# Patient Record
Sex: MALE | Race: WHITE | Employment: FULL TIME | URBAN - METROPOLITAN AREA
[De-identification: names, ages, dates, MRNs, and addresses within clinical notes are randomized per-mention and may not be internally consistent; named-entity substitution may affect disease eponyms.]

---

## 2019-01-07 ENCOUNTER — TELEPHONE (OUTPATIENT)
Dept: FAMILY MEDICINE CLINIC | Age: 54
End: 2019-01-07

## 2019-01-11 ENCOUNTER — TELEPHONE (OUTPATIENT)
Dept: FAMILY MEDICINE CLINIC | Age: 54
End: 2019-01-11

## 2019-01-16 ENCOUNTER — OFFICE VISIT (OUTPATIENT)
Dept: FAMILY MEDICINE CLINIC | Age: 54
End: 2019-01-16

## 2019-01-16 VITALS
HEIGHT: 68 IN | HEART RATE: 84 BPM | WEIGHT: 196 LBS | BODY MASS INDEX: 29.7 KG/M2 | SYSTOLIC BLOOD PRESSURE: 110 MMHG | DIASTOLIC BLOOD PRESSURE: 78 MMHG

## 2019-01-16 DIAGNOSIS — M54.42 CHRONIC LEFT-SIDED LOW BACK PAIN WITH LEFT-SIDED SCIATICA: ICD-10-CM

## 2019-01-16 DIAGNOSIS — G89.29 CHRONIC LEFT-SIDED LOW BACK PAIN WITH LEFT-SIDED SCIATICA: ICD-10-CM

## 2019-01-16 DIAGNOSIS — I82.492 ACUTE DEEP VEIN THROMBOSIS (DVT) OF OTHER SPECIFIED VEIN OF LEFT LOWER EXTREMITY (HCC): Primary | ICD-10-CM

## 2019-01-16 PROBLEM — I82.409 DEEP VEIN THROMBOSIS (DVT) (HCC): Status: ACTIVE | Noted: 2019-01-01

## 2019-01-16 PROCEDURE — 99213 OFFICE O/P EST LOW 20 MIN: CPT | Performed by: FAMILY MEDICINE

## 2019-01-16 RX ORDER — RIVAROXABAN 15 MG/1
TABLET, FILM COATED ORAL
Refills: 0 | COMMUNITY
Start: 2019-01-07 | End: 2019-05-30 | Stop reason: ALTCHOICE

## 2019-01-16 ASSESSMENT — PATIENT HEALTH QUESTIONNAIRE - PHQ9
SUM OF ALL RESPONSES TO PHQ9 QUESTIONS 1 & 2: 0
SUM OF ALL RESPONSES TO PHQ QUESTIONS 1-9: 0
SUM OF ALL RESPONSES TO PHQ QUESTIONS 1-9: 0
1. LITTLE INTEREST OR PLEASURE IN DOING THINGS: 0
2. FEELING DOWN, DEPRESSED OR HOPELESS: 0

## 2019-01-16 ASSESSMENT — ENCOUNTER SYMPTOMS
EYES NEGATIVE: 1
BACK PAIN: 1
BLOOD IN STOOL: 0
COUGH: 0
ABDOMINAL PAIN: 0
SHORTNESS OF BREATH: 0

## 2019-05-30 ENCOUNTER — OFFICE VISIT (OUTPATIENT)
Dept: FAMILY MEDICINE CLINIC | Age: 54
End: 2019-05-30

## 2019-05-30 VITALS
TEMPERATURE: 98.1 F | WEIGHT: 195 LBS | DIASTOLIC BLOOD PRESSURE: 88 MMHG | BODY MASS INDEX: 29.65 KG/M2 | SYSTOLIC BLOOD PRESSURE: 126 MMHG | HEART RATE: 76 BPM

## 2019-05-30 DIAGNOSIS — R10.30 LOWER ABDOMINAL PAIN: Primary | ICD-10-CM

## 2019-05-30 DIAGNOSIS — Z12.11 SCREEN FOR COLON CANCER: ICD-10-CM

## 2019-05-30 LAB
BILIRUBIN, POC: NORMAL
BLOOD URINE, POC: NORMAL
CLARITY, POC: CLEAR
COLOR, POC: YELLOW
GLUCOSE URINE, POC: NORMAL
KETONES, POC: NORMAL
LEUKOCYTE EST, POC: NORMAL
NITRITE, POC: NORMAL
PH, POC: 6.5
PROTEIN, POC: NORMAL
SPECIFIC GRAVITY, POC: 1
UROBILINOGEN, POC: 1

## 2019-05-30 PROCEDURE — 81003 URINALYSIS AUTO W/O SCOPE: CPT | Performed by: FAMILY MEDICINE

## 2019-05-30 PROCEDURE — 99213 OFFICE O/P EST LOW 20 MIN: CPT | Performed by: FAMILY MEDICINE

## 2019-05-30 ASSESSMENT — ENCOUNTER SYMPTOMS
BELCHING: 0
BLOOD IN STOOL: 0
COUGH: 0
FLATUS: 1
VOMITING: 0
CONSTIPATION: 1
DIARRHEA: 0
NAUSEA: 0
SHORTNESS OF BREATH: 0
EYES NEGATIVE: 1
ABDOMINAL PAIN: 1

## 2019-05-30 NOTE — PROGRESS NOTES
[facility] PHYSICIANS  [facility]  [address] [facility]  [address] [facility] [address]  Dept: [phone]    5/30/2019    CHIEF COMPLAINT    Chief Complaint   Patient presents with    Abdominal Pain     2 days       HPI    Jonatan Adjutant is a 47 y.o. male who presents   Chief Complaint   Patient presents with    Abdominal Pain     2 days   . Abdominal Pain   This is a new problem. The current episode started in the past 7 days. The onset quality is sudden. The problem occurs daily. The problem has been gradually improving. The pain is located in the LLQ and RLQ (worse on left). The pain is moderate. The quality of the pain is colicky. The abdominal pain does not radiate. Associated symptoms include constipation (mild) and flatus. Pertinent negatives include no belching, diarrhea, fever, frequency, headaches, hematuria, nausea or vomiting. The pain is aggravated by urination and movement (sharp pain spike when urinating. ). The pain is relieved by nothing. Treatments tried: bland diet. The treatment provided mild relief. Vitals:    05/30/19 0920   BP: 126/88   Pulse: 76   Temp: 98.1 °F (36.7 °C)   Weight: 195 lb (88.5 kg)       REVIEW OF SYSTEMS    Review of Systems   Constitutional: Negative for fatigue and fever. HENT: Negative. Eyes: Negative. Respiratory: Negative for cough and shortness of breath. Cardiovascular: Negative for chest pain and leg swelling. Gastrointestinal: Positive for abdominal pain, constipation (mild) and flatus. Negative for blood in stool, diarrhea, nausea and vomiting. See hpi   Genitourinary: Negative for frequency, hematuria and urgency. Musculoskeletal: Negative. Skin: Negative. Neurological: Negative for dizziness and headaches.        PAST MEDICAL HISTORY    Past Medical History:   Diagnosis Date    Chronic back pain     Deep vein thrombosis (DVT) ([facility] [address] 2019    left calf       FAMILY HISTORY    Family History   Problem Relation Age of Onset    Irritable Bowel Syndrome Mother        SOCIAL HISTORY    Social History     Socioeconomic History    Marital status:      Spouse name: None    Number of children: None    Years of education: None    Highest education level: None   Occupational History    None   Social Needs    Financial resource strain: None    Food insecurity:     Worry: None     Inability: None    Transportation needs:     Medical: None     Non-medical: None   Tobacco Use    Smoking status: Never Smoker    Smokeless tobacco: Never Used   Substance and Sexual Activity    Alcohol use: No    Drug use: No    Sexual activity: Yes     Partners: Female   Lifestyle    Physical activity:     Days per week: None     Minutes per session: None    Stress: None   Relationships    Social connections:     Talks on phone: None     Gets together: None     Attends [employer] service: None     Active member of club or organization: None     Attends meetings of clubs or organizations: None     Relationship status: None    Intimate partner violence:     Fear of current or ex partner: None     Emotionally abused: None     Physically abused: None     Forced sexual activity: None   Other Topics Concern    None   Social History Narrative    None       SURGICAL HISTORY    History reviewed. No pertinent surgical history. CURRENT MEDICATIONS    Current Outpatient Medications   Medication Sig Dispense Refill    aspirin 81 MG tablet Take 162 mg by mouth       No current facility-administered medications for this visit. ALLERGIES    Allergies   Allergen Reactions    Nsaids        PHYSICAL EXAM   Physical Exam   Constitutional: He is oriented to person, place, and time. He appears well-developed and well-nourished. HENT:   Head: Normocephalic. Eyes: Pupils are equal, round, and reactive to light. Neck: Normal range of motion. Neck supple.    Cardiovascular: Normal rate, regular rhythm and normal heart sounds. No murmur heard. Pulmonary/Chest: Effort normal and breath sounds normal. He has no wheezes. He has no rales. Abdominal: Soft. There is no hepatosplenomegaly. There is tenderness in the right lower quadrant and left lower quadrant. There is no rebound and no guarding. Left lower quad tenderness greater than right   Musculoskeletal: Normal range of motion. He exhibits no edema, tenderness or deformity. Lymphadenopathy:     He has no cervical adenopathy. Neurological: He is alert and oriented to person, place, and time. Skin: Skin is warm and dry. Psychiatric: He has a normal mood and affect. His behavior is normal.   Vitals reviewed. Assessment/PLan  1. Lower abdominal pain  ua-normal. Continue hydration, bland diet. Watch for fever, increase in abd pain. If worse go to ER. Discussed possible appendicitis especially if pain is on the rt side. Possible diverticulitis if pain worsens on left side. - POCT Urinalysis No Micro (Auto)-normal.     2. Screen for colon cancer    - COLOGUARD; Future      Mahendra received counseling on the following healthy behaviors: nutrition and exercise  Reviewed prior labs and health maintenance. Continue current medications, diet and exercise. Discussed use, benefit, and side effects of prescribed medications. Barriers to medication compliance addressed. Patient given educational materials - see patient instructions. All patient questions answered. Patient voiced understanding. Return if symptoms worsen or fail to improve.         Electronically signed by Julius Jenkins MD on 5/30/19 at 9:36 AM

## 2021-10-11 ENCOUNTER — TELEPHONE (OUTPATIENT)
Dept: FAMILY MEDICINE CLINIC | Age: 56
End: 2021-10-11

## 2021-10-11 ENCOUNTER — NURSE TRIAGE (OUTPATIENT)
Dept: OTHER | Facility: CLINIC | Age: 56
End: 2021-10-11

## 2021-10-11 DIAGNOSIS — U07.1 COVID-19: Primary | ICD-10-CM

## 2021-10-11 DIAGNOSIS — J01.90 ACUTE BACTERIAL SINUSITIS: ICD-10-CM

## 2021-10-11 DIAGNOSIS — R05.9 COUGH: ICD-10-CM

## 2021-10-11 DIAGNOSIS — U07.1 COVID-19: ICD-10-CM

## 2021-10-11 DIAGNOSIS — B96.89 ACUTE BACTERIAL SINUSITIS: ICD-10-CM

## 2021-10-11 RX ORDER — BENZONATATE 200 MG/1
200 CAPSULE ORAL 3 TIMES DAILY PRN
Qty: 30 CAPSULE | Refills: 1 | Status: SHIPPED | OUTPATIENT
Start: 2021-10-11

## 2021-10-11 RX ORDER — AZITHROMYCIN 250 MG/1
TABLET, FILM COATED ORAL
Qty: 1 PACKET | Refills: 0 | Status: SHIPPED | OUTPATIENT
Start: 2021-10-11 | End: 2021-10-21

## 2021-10-11 RX ORDER — AMOXICILLIN 875 MG/1
875 TABLET, COATED ORAL 2 TIMES DAILY
Qty: 20 TABLET | Refills: 0 | Status: SHIPPED | OUTPATIENT
Start: 2021-10-11 | End: 2021-10-11 | Stop reason: SINTOL

## 2021-10-11 NOTE — TELEPHONE ENCOUNTER
Patient stated he is on day 8 of covid, asking for antibiotic. Patient has a fever, he stated it feels like he has a sinus injection, lots of pressure in his face, cough is mild. He is taking a decongestant and its not helping.  No trouble breathing

## 2021-10-11 NOTE — PROGRESS NOTES
Late entry: Spoke to patient 30 minutes ago. Patient has allergy to binder and NSAIDs called Jo. He took 1 dose of amoxicillin around 2 PM and experienced similar reaction. He notes tightness in his chest and shortness of breath. Denies dysphagia or sensation that his throat is closing. Advised patient to take Benadryl for above reaction. Prescribed Z-Dipesh to replace amoxicillin. Discussed current symptoms and also prescribed Tessalon Perles for cough. Advised patient to stay well-hydrated, increase fluids, rest and can continue taking over-the-counter decongestant. Discussed importance of staying in quarantine while symptomatic as he still had a fever this morning.     Additionally, advised patient to schedule physical as he has not been seen since 2019

## 2021-10-11 NOTE — TELEPHONE ENCOUNTER
Received call from Juanjo at [facility] with [facility]. Brief description of triage:   Difficulty breathing due to an allergic reaction to amoxicillin, states that he simply wants a new medication prescribed    Triage indicates for patient to: No triage at this time    Care advice provided, patient verbalizes understanding; denies any other questions or concerns; instructed to call back for any new or worsening symptoms. Writer provided warm transfer to Palak  at [facility] for appointment scheduling. Attention Provider: Thank you for allowing me to participate in the care of your patient. The patient was connected to triage in response to information provided to the ECC/PSC. Please do not respond through this encounter as the response is not directed to a shared pool. Reason for Disposition   Health Information question, no triage required and triager able to answer question    Answer Assessment - Initial Assessment Questions  1. REASON FOR CALL or QUESTION: \"What is your reason for calling today? \" or \"How can I best help you? \" or \"What question do you have that I can help answer? \"      See above note    Protocols used: INFORMATION ONLY CALL - NO TRIAGE-ADULT-

## 2021-10-12 ENCOUNTER — TELEPHONE (OUTPATIENT)
Dept: FAMILY MEDICINE CLINIC | Age: 56
End: 2021-10-12

## 2021-10-12 DIAGNOSIS — U07.1 COVID-19: ICD-10-CM

## 2021-10-12 DIAGNOSIS — Z88.0 ALLERGY TO AMOXICILLIN: Primary | ICD-10-CM

## 2021-10-12 RX ORDER — PREDNISONE 10 MG/1
10 TABLET ORAL 2 TIMES DAILY
Qty: 6 TABLET | Refills: 0 | Status: SHIPPED | OUTPATIENT
Start: 2021-10-12 | End: 2021-10-15

## 2021-10-12 NOTE — TELEPHONE ENCOUNTER
Pt called back a second time, is hot. Using ice and not helping. Also, having dizzy spells because of feeling hot.

## 2021-10-12 NOTE — TELEPHONE ENCOUNTER
Please clarify. After speaking with Rosalio Dust it sounds as if the patient is not feverish, but his \"feeling hot\" could be related to the amoxicillin reaction. Does he have a rash or is he having any trouble breathing? I can call in steroids which will help with this reaction, but may cause more hot and cold feeling as this is a side effect.   Prednisone may also cause slight tachycardia and trouble sleeping so I would advise he not take it past 530 or 6 PM.  Rx sent for 3-day burst of prednisone

## 2021-10-12 NOTE — TELEPHONE ENCOUNTER
1.  Pt called to thank you for your compassion. 2.  Should he have a steroid with his current antibiotics? [address]  Alejo Peres [phone] - F [phone]

## 2021-10-12 NOTE — TELEPHONE ENCOUNTER
Pt stated it is not the reaction to the Amox, still wishes to go with steroid to speed up the process. Pt is aware of side effects, agrees to still order steroids.